# Patient Record
Sex: FEMALE | Race: WHITE | URBAN - METROPOLITAN AREA
[De-identification: names, ages, dates, MRNs, and addresses within clinical notes are randomized per-mention and may not be internally consistent; named-entity substitution may affect disease eponyms.]

---

## 2019-05-29 ENCOUNTER — OFFICE VISIT (OUTPATIENT)
Dept: URBAN - METROPOLITAN AREA CLINIC 22 | Facility: CLINIC | Age: 29
End: 2019-05-29
Payer: COMMERCIAL

## 2019-05-29 PROCEDURE — 92015 DETERMINE REFRACTIVE STATE: CPT | Performed by: OPTOMETRIST

## 2019-05-29 PROCEDURE — 92004 COMPRE OPH EXAM NEW PT 1/>: CPT | Performed by: OPTOMETRIST

## 2019-05-29 PROCEDURE — 92310 CONTACT LENS FITTING OU: CPT | Performed by: OPTOMETRIST

## 2019-05-29 ASSESSMENT — VISUAL ACUITY
OD: 20/20
OS: 20/20

## 2019-05-29 ASSESSMENT — INTRAOCULAR PRESSURE
OS: 20
OD: 21

## 2019-05-29 ASSESSMENT — KERATOMETRY
OD: 45.37
OS: 45.05

## 2019-05-29 NOTE — IMPRESSION/PLAN
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 Plan: Discussed diagnosis in detail with patient. New glasses Rx was given today. Gave trial lens to pt 1.25 and ordered 1.50 trials they weren't in stock.

## 2019-06-12 ENCOUNTER — TESTING ONLY (OUTPATIENT)
Dept: URBAN - METROPOLITAN AREA CLINIC 22 | Facility: CLINIC | Age: 29
End: 2019-06-12

## 2019-06-12 PROCEDURE — 92310 CONTACT LENS FITTING OU: CPT | Performed by: OPTOMETRIST

## 2020-03-11 ENCOUNTER — OFFICE VISIT (OUTPATIENT)
Dept: URBAN - METROPOLITAN AREA CLINIC 22 | Facility: CLINIC | Age: 30
End: 2020-03-11
Payer: COMMERCIAL

## 2020-03-11 DIAGNOSIS — H52.13 MYOPIA, BILATERAL: Primary | ICD-10-CM

## 2020-03-11 DIAGNOSIS — H16.223 KERATOCONJUNCTIVITIS SICCA, NOT SPECIFIED AS SJÖGREN'S, BILATERAL: ICD-10-CM

## 2020-03-11 PROCEDURE — 92014 COMPRE OPH EXAM EST PT 1/>: CPT | Performed by: OPTOMETRIST

## 2020-03-11 RX ORDER — LOTEPREDNOL ETABONATE 3.8 MG/G
0.38 % GEL OPHTHALMIC
Qty: 5 | Refills: 0 | Status: ACTIVE
Start: 2020-03-11

## 2020-03-11 ASSESSMENT — VISUAL ACUITY
OS: 20/20
OD: 20/20

## 2020-03-11 ASSESSMENT — KERATOMETRY
OD: 45.17
OS: 46.23

## 2020-03-11 ASSESSMENT — INTRAOCULAR PRESSURE: OS: 19
